# Patient Record
Sex: FEMALE | Race: OTHER | Employment: FULL TIME | URBAN - METROPOLITAN AREA
[De-identification: names, ages, dates, MRNs, and addresses within clinical notes are randomized per-mention and may not be internally consistent; named-entity substitution may affect disease eponyms.]

---

## 2017-01-21 ENCOUNTER — HOSPITAL ENCOUNTER (EMERGENCY)
Facility: HOSPITAL | Age: 22
Discharge: HOME | End: 2017-01-21
Attending: EMERGENCY MEDICINE
Payer: OTHER MISCELLANEOUS

## 2017-01-21 VITALS
OXYGEN SATURATION: 97 % | DIASTOLIC BLOOD PRESSURE: 59 MMHG | TEMPERATURE: 98 F | WEIGHT: 100 LBS | HEART RATE: 67 BPM | HEIGHT: 60 IN | BODY MASS INDEX: 19.63 KG/M2 | RESPIRATION RATE: 18 BRPM | SYSTOLIC BLOOD PRESSURE: 116 MMHG

## 2017-01-21 DIAGNOSIS — IMO0001 NEEDLESTICK INJURY ACCIDENT, INITIAL ENCOUNTER: Primary | ICD-10-CM

## 2017-01-21 LAB
HBV SURFACE AB SER QL: REACTIVE
HBV SURFACE AB SERPL IA-ACNC: >1000 MIU/ML
HEPATITIS C VIRUS AB INTERPRETATION: NONREACTIVE
HIVS EXPOSURE ONLY: NONREACTIVE

## 2017-01-21 PROCEDURE — 36415 COLL VENOUS BLD VENIPUNCTURE: CPT

## 2017-01-21 PROCEDURE — 86703 HIV-1/HIV-2 1 RESULT ANTBDY: CPT | Performed by: EMERGENCY MEDICINE

## 2017-01-21 PROCEDURE — 86706 HEP B SURFACE ANTIBODY: CPT | Performed by: EMERGENCY MEDICINE

## 2017-01-21 PROCEDURE — 86803 HEPATITIS C AB TEST: CPT | Performed by: EMERGENCY MEDICINE

## 2017-01-21 PROCEDURE — 99283 EMERGENCY DEPT VISIT LOW MDM: CPT

## 2017-01-21 RX ORDER — ASCORBIC ACID 500 MG
TABLET ORAL
COMMUNITY
End: 2018-10-18

## 2017-01-21 NOTE — ED PROVIDER NOTES
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

## 2017-01-21 NOTE — ED INITIAL ASSESSMENT (HPI)
Pt to ed from home with c/o needle stick to l hand 2nd finger, active bleeding from the stick, denies pain, no bleeding from stick while in the ED

## 2017-01-21 NOTE — ED NOTES
Pt present to ed from work with c/o finger stick from a pt on the floor, labs collected and sent, pt waiting test results.

## 2017-02-07 ENCOUNTER — APPOINTMENT (OUTPATIENT)
Dept: OTHER | Facility: HOSPITAL | Age: 22
End: 2017-02-07
Attending: PREVENTIVE MEDICINE

## 2017-08-31 PROCEDURE — 87491 CHLMYD TRACH DNA AMP PROBE: CPT | Performed by: FAMILY MEDICINE

## 2017-08-31 PROCEDURE — 88175 CYTOPATH C/V AUTO FLUID REDO: CPT | Performed by: FAMILY MEDICINE

## 2017-08-31 PROCEDURE — 87591 N.GONORRHOEAE DNA AMP PROB: CPT | Performed by: FAMILY MEDICINE

## 2018-02-04 ENCOUNTER — HOSPITAL ENCOUNTER (OUTPATIENT)
Age: 23
Discharge: HOME | End: 2018-02-04
Attending: FAMILY MEDICINE
Payer: COMMERCIAL

## 2018-02-04 VITALS
DIASTOLIC BLOOD PRESSURE: 85 MMHG | RESPIRATION RATE: 16 BRPM | SYSTOLIC BLOOD PRESSURE: 130 MMHG | HEART RATE: 86 BPM | OXYGEN SATURATION: 98 % | TEMPERATURE: 98 F | WEIGHT: 102 LBS | BODY MASS INDEX: 20.03 KG/M2 | HEIGHT: 60 IN

## 2018-02-04 DIAGNOSIS — R19.7 DIARRHEA, UNSPECIFIED TYPE: ICD-10-CM

## 2018-02-04 DIAGNOSIS — E87.6 HYPOKALEMIA: ICD-10-CM

## 2018-02-04 DIAGNOSIS — K52.9 GASTROENTERITIS: Primary | ICD-10-CM

## 2018-02-04 DIAGNOSIS — R11.2 NAUSEA AND VOMITING IN ADULT: ICD-10-CM

## 2018-02-04 DIAGNOSIS — R10.32 ABDOMINAL PAIN, LEFT LOWER QUADRANT: ICD-10-CM

## 2018-02-04 LAB
#LYMPHOCYTE IC: 1.4 X10ˆ3/UL (ref 0.9–3.2)
#MXD IC: 0.4 X10ˆ3/UL (ref 0.1–1)
#NEUTROPHIL IC: 2.4 X10ˆ3/UL (ref 1.3–6.7)
CREAT SERPL-MCNC: 0.8 MG/DL (ref 0.55–1.02)
GLUCOSE BLD-MCNC: 100 MG/DL (ref 70–99)
HCT IC: 44.1 % (ref 37–54)
HGB IC: 14.8 G/DL (ref 12–16)
ISTAT BLOOD GAS TCO2: 26 MMOL/L (ref 22–32)
ISTAT BUN: 13 MG/DL (ref 8–20)
ISTAT CHLORIDE: 99 MMOL/L (ref 101–111)
ISTAT HEMATOCRIT: 45 % (ref 34–50)
ISTAT IONIZED CALCIUM: 1.15 MMOL/L (ref 1.12–1.32)
ISTAT POTASSIUM: 3 MMOL/L (ref 3.6–5.1)
ISTAT SODIUM: 138 MMOL/L (ref 136–144)
LYMPHOCYTES NFR BLD AUTO: 33.1 %
MCH IC: 29.7 PG (ref 27–33.2)
MCHC IC: 33.6 G/DL (ref 31–37)
MCV IC: 88.6 FL (ref 81–100)
MIXED CELL %: 8.5 %
NEUTROPHILS NFR BLD AUTO: 58.4 %
PLT IC: 220 X10ˆ3/UL (ref 150–450)
POCT GLUCOSE URINE: NEGATIVE MG/DL
POCT KETONE URINE: 40 MG/DL
POCT LEUKOCYTE ESTERASE URINE: NEGATIVE
POCT NITRITE URINE: NEGATIVE
POCT PH URINE: 6 (ref 5–8)
POCT SPECIFIC GRAVITY URINE: 1.03
POCT URINE CLARITY: CLEAR
POCT URINE PREGNANCY: NEGATIVE
POCT UROBILINOGEN URINE: 1 MG/DL
RBC IC: 4.98 X10ˆ6/UL (ref 3.8–5.1)
WBC IC: 4.2 X10ˆ3/UL (ref 4–13)

## 2018-02-04 PROCEDURE — 87086 URINE CULTURE/COLONY COUNT: CPT | Performed by: FAMILY MEDICINE

## 2018-02-04 PROCEDURE — 96360 HYDRATION IV INFUSION INIT: CPT

## 2018-02-04 PROCEDURE — 99204 OFFICE O/P NEW MOD 45 MIN: CPT

## 2018-02-04 PROCEDURE — 80047 BASIC METABLC PNL IONIZED CA: CPT

## 2018-02-04 PROCEDURE — 99214 OFFICE O/P EST MOD 30 MIN: CPT

## 2018-02-04 PROCEDURE — 85025 COMPLETE CBC W/AUTO DIFF WBC: CPT | Performed by: FAMILY MEDICINE

## 2018-02-04 PROCEDURE — 81002 URINALYSIS NONAUTO W/O SCOPE: CPT | Performed by: FAMILY MEDICINE

## 2018-02-04 PROCEDURE — 81025 URINE PREGNANCY TEST: CPT | Performed by: FAMILY MEDICINE

## 2018-02-04 RX ORDER — SODIUM CHLORIDE 9 MG/ML
INJECTION, SOLUTION INTRAVENOUS ONCE
Status: COMPLETED | OUTPATIENT
Start: 2018-02-04 | End: 2018-02-04

## 2018-02-04 RX ORDER — ONDANSETRON 4 MG/1
4 TABLET, ORALLY DISINTEGRATING ORAL EVERY 8 HOURS PRN
Qty: 10 TABLET | Refills: 0 | Status: SHIPPED | OUTPATIENT
Start: 2018-02-04 | End: 2018-10-18

## 2018-02-04 RX ORDER — POTASSIUM CHLORIDE 1500 MG/1
20 TABLET, FILM COATED, EXTENDED RELEASE ORAL DAILY
Qty: 2 TABLET | Refills: 0 | Status: SHIPPED | OUTPATIENT
Start: 2018-02-04 | End: 2018-02-06

## 2018-02-04 NOTE — ED INITIAL ASSESSMENT (HPI)
Since Friday, c/o vomiting, nausea, headache and diarrhea. Vomited approximately 4-5 times and >20 episodes of diarrhea. Also has constant LLQ abdominal pain. Denies change of diet. Had a fever last night. Took Tylenol last night.

## 2018-02-04 NOTE — ED PROVIDER NOTES
Patient Seen in: [facility]    History   Patient presents with:  Nausea/Vomiting/Diarrhea (gastrointestinal)    Stated Complaint: NAUSEA, VOMITTING X1DAYS    65-year-old female comes in with concerns of having nausea, vomi light-headedness. Hematological: Negative for adenopathy. Positive for stated complaint: NAUSEA, VOMITTING X3 DAYS  Other systems are as noted in HPI. Constitutional and vital signs reviewed.       All other systems reviewed and negative except as ISTAT Glucose 100 (*)     All other components within normal limits   POCT PREGNANCY, URINE - Normal   POCT CBC   STOOL CULTURE W/SHIGATOXIN   URINE CULTURE, ROUTINE       ED Course as of Feb 04 1218  ---------------------------------------------------- 0  Associated Diagnoses:Hypokalemia

## 2018-02-08 ENCOUNTER — HOSPITAL ENCOUNTER (EMERGENCY)
Facility: HOSPITAL | Age: 23
Discharge: HOME | End: 2018-02-08
Attending: EMERGENCY MEDICINE
Payer: OTHER MISCELLANEOUS

## 2018-02-08 VITALS
WEIGHT: 102 LBS | HEIGHT: 60 IN | HEART RATE: 64 BPM | RESPIRATION RATE: 17 BRPM | BODY MASS INDEX: 20.03 KG/M2 | OXYGEN SATURATION: 100 % | TEMPERATURE: 98 F | SYSTOLIC BLOOD PRESSURE: 112 MMHG | DIASTOLIC BLOOD PRESSURE: 71 MMHG

## 2018-02-08 DIAGNOSIS — IMO0001 NEEDLE STICK INJURY OF FINGER OF LEFT HAND, INITIAL ENCOUNTER: Primary | ICD-10-CM

## 2018-02-08 LAB
HBV SURFACE AB SER QL: REACTIVE
HBV SURFACE AB SERPL IA-ACNC: 960.48 MIU/ML
HEPATITIS C VIRUS AB INTERPRETATION: NONREACTIVE
RAPID HIV: NONREACTIVE

## 2018-02-08 PROCEDURE — 99283 EMERGENCY DEPT VISIT LOW MDM: CPT

## 2018-02-08 PROCEDURE — 36415 COLL VENOUS BLD VENIPUNCTURE: CPT

## 2018-02-08 PROCEDURE — 86706 HEP B SURFACE ANTIBODY: CPT | Performed by: EMERGENCY MEDICINE

## 2018-02-08 PROCEDURE — 86701 HIV-1ANTIBODY: CPT | Performed by: EMERGENCY MEDICINE

## 2018-02-08 PROCEDURE — 87389 HIV-1 AG W/HIV-1&-2 AB AG IA: CPT | Performed by: EMERGENCY MEDICINE

## 2018-02-08 PROCEDURE — 86803 HEPATITIS C AB TEST: CPT | Performed by: EMERGENCY MEDICINE

## 2018-02-08 NOTE — ED INITIAL ASSESSMENT (HPI)
Left thumb needlestick 1 hr ago. Pt states she was starting an IV on her Pt, Pt moved and she stuck her self. Tetanus up to date, as stated.

## 2018-02-08 NOTE — ED PROVIDER NOTES
Patient Seen in: [facility] Emergency Department    History   Patient presents with:  Exposure,Chem Occupational (infectious)    Stated Complaint: STUCK WITH NEEDLE    HPI    The patient is a 58-year-old previously healthy female employee at [employer] active range of motion of all 4 extremities. Distal pulses normal and symmetric  Skin: Tiny puncture wound to the radial aspect of the left index finger proximal nail plate. No bleeding. No foreign body or contamination.   Psych: Normal interaction, coop being drawn. The exposed patient's baseline labs were also drawn. MDM         Source patient's rapid HIV is negative. Patient opted not to take prophylaxis. She is to follow-up with occupational health as an outpatient.   She was discharged in stable

## 2019-05-29 ENCOUNTER — APPOINTMENT (OUTPATIENT)
Dept: OTHER | Facility: HOSPITAL | Age: 24
End: 2019-05-29
Attending: FAMILY MEDICINE
Payer: OTHER MISCELLANEOUS

## (undated) NOTE — ED AVS SNAPSHOT
Rell Liu   MRN: [id number]    Department:  [facility] Emergency Department   Date of Visit:  2/8/2018           Disclosure     Insurance plans vary and the physician(s) referred by the ER may not be covered by your plan.  Please contact y tell this physician (or your personal doctor if your instructions are to return to your personal doctor) about any new or lasting problems. The primary care or specialist physician will see patients referred from the [facility] Emergency Department.  Lizz Juarez

## (undated) NOTE — ED AVS SNAPSHOT
[facility] Emergency Department    [facility]  [address] [address]    Phone:  [phone]    Fax:  [phone]           Babs Germain   MRN: [id number]    Department:  [facility] Emergency Department   Date of Visit:  1 self-assessment the day after your visit. You may also receive a call from our patient liason soon after your visit. Also, some patients receive a detailed feedback survey mailed to them a week after the visit.   If you receive this, we would really apprec [facility] [address] [facility] [address] [facility] [id number] [address] ([address]) [address] [facility]. ([address]) [facility] If you have questions, you can call [phone] to talk to our [facility] Staff. Remember, [employer] is NOT to be used for urgent needs. For medical emergencies, dial 911.

## (undated) NOTE — ED AVS SNAPSHOT
[facility] Emergency Department    [place]  [address] [facility] [address]    Phone:  [phone]    Fax:  [phone]           Staci Aguirre   MRN: [id number]    Department:  [facility] Emergency Department   Date of Visit:  1 IF THERE IS ANY CHANGE OR WORSENING OF YOUR CONDITION, CALL YOUR PRIMARY CARE PHYSICIAN AT ONCE OR RETURN IMMEDIATELY TO THE EMERGENCY DEPARTMENT.     If you have been prescribed any medication(s), please fill your prescription right away and begin taking t